# Patient Record
Sex: MALE | Race: BLACK OR AFRICAN AMERICAN | Employment: PART TIME | URBAN - METROPOLITAN AREA
[De-identification: names, ages, dates, MRNs, and addresses within clinical notes are randomized per-mention and may not be internally consistent; named-entity substitution may affect disease eponyms.]

---

## 2017-06-17 ENCOUNTER — HOSPITAL ENCOUNTER (EMERGENCY)
Facility: CLINIC | Age: 27
Discharge: HOME | End: 2017-06-17
Attending: EMERGENCY MEDICINE | Admitting: EMERGENCY MEDICINE

## 2017-06-17 VITALS
OXYGEN SATURATION: 100 % | HEART RATE: 64 BPM | DIASTOLIC BLOOD PRESSURE: 82 MMHG | RESPIRATION RATE: 18 BRPM | SYSTOLIC BLOOD PRESSURE: 119 MMHG | TEMPERATURE: 97.1 F

## 2017-06-17 DIAGNOSIS — K04.7 DENTAL ABSCESS: ICD-10-CM

## 2017-06-17 DIAGNOSIS — R68.84 PAIN IN LOWER JAW: ICD-10-CM

## 2017-06-17 PROCEDURE — 99283 EMERGENCY DEPT VISIT LOW MDM: CPT

## 2017-06-17 PROCEDURE — 41800 DRAINAGE OF GUM LESION: CPT

## 2017-06-17 RX ORDER — CLINDAMYCIN HCL 300 MG
300 CAPSULE ORAL 3 TIMES DAILY
Qty: 30 CAPSULE | Refills: 0 | Status: SHIPPED | OUTPATIENT
Start: 2017-06-17 | End: 2017-06-27

## 2017-06-17 RX ORDER — LIDOCAINE HYDROCHLORIDE AND EPINEPHRINE BITARTRATE 20; .01 MG/ML; MG/ML
INJECTION, SOLUTION SUBCUTANEOUS
Status: DISCONTINUED
Start: 2017-06-17 | End: 2017-06-17 | Stop reason: HOSPADM

## 2017-06-17 RX ORDER — HYDROCODONE BITARTRATE AND ACETAMINOPHEN 5; 325 MG/1; MG/1
1-2 TABLET ORAL EVERY 4 HOURS PRN
Qty: 12 TABLET | Refills: 0 | Status: SHIPPED | OUTPATIENT
Start: 2017-06-17 | End: 2018-07-17

## 2017-06-17 ASSESSMENT — ENCOUNTER SYMPTOMS: FEVER: 0

## 2017-06-17 NOTE — ED PROVIDER NOTES
History     Chief Complaint:  Dental Pain      The history is provided by the patient.      Humberto Jacinto is a 26 year old male who presents with dental pain. Patient states that he has been having dental pain for the last three days. He believes he has a dental abscess which led him to present to the ED. Patient denies any recent antibiotics. Patient denies fevers. Patient denies all other complaints.     Allergies:  No known drug allergies      Medications:    The patient is not currently taking any prescribed medications.     Past Medical History:    The patient does not have any past pertinent medical history.     Past Surgical History:    History reviewed. No pertinent surgical history.     Family History:    History reviewed. No pertinent family history.      Social History:  Presents alone   Tobacco use: Yes  Marital Status:  Single     Review of Systems   Constitutional: Negative for fever.   HENT: Positive for dental problem.    All other systems reviewed and are negative.      Physical Exam     Patient Vitals for the past 24 hrs:   BP Temp Temp src Pulse Resp SpO2   06/17/17 0637 119/82 - - - - 100 %   06/17/17 0545 (!) 119/91 97.1  F (36.2  C) Temporal 64 18 100 %      Physical Exam  Nursing note and vitals reviewed.  Constitutional: Cooperative.   HENT:   Mouth/Throat: Mucous membranes are normal. Poor overall dentition. Multiple dental erosions to the level of the gum. Dental abscess associated with left lower molars. No sublingual swelling.   Cardiovascular: Normal rate, regular rhythm and normal heart sounds.  No murmur.  Pulmonary/Chest: Effort normal and breath sounds normal. No respiratory distress.   Neurological: Alert.   Skin: Skin is warm and dry.  Psychiatric: Normal mood and affect.      Emergency Department Course   Procedures:  PROCEDURE:  Dental Block    LOCATION:   Left inferior alveolar dental block     ANESTHESIA: Regional block using Lido w/ epi, total of 1.8 mLs    PROCEDURE NOTE:  The patient tolerated the procedure well with good relief of dental discomfort and there were no complications.      Procedure: Incision and Drainage   LOCATIONS: left lower jaw      ANESTHESIA:  Local field block using Lidocaine 1% with epinephrine, total of 1.8 mLs     PROCEDURE:  Area was incised with # 11 Blade ([place]) with a Single Straight incision.  Wound treatment included Purulent Drainage.     Patient Status:        Patient tolerated the procedure well. There were no complications.    Emergency Department Course:  Past medical records, nursing notes, and vitals reviewed.  0551: I performed an exam of the patient and obtained history as documented above.    Above workup undertaken.  I personally reviewed the laboratory results with the Patient and answered all related questions prior to discharge.        Impression & Plan    Medical Decision Making:  Humberto Jacinto is a 26 year old male presents left lower jaw pain. He has clinically obvious dental abscess associated with very poor dentition and dental erosions to the level of the gum line. Abscess was incised and drained as in the procedure note. We will start him on antibiotics with a short course of pain medication and have him follow up with dentistry this week.       Diagnosis:    ICD-10-CM   1. Pain in lower jaw R68.84   2. Dental abscess K04.7       Disposition:  Discharged to home with plan as outlined.    Discharge Medications:  Discharge Medication List as of 6/17/2017  6:32 AM      START taking these medications    Details   clindamycin (CLEOCIN) 300 MG capsule Take 1 capsule (300 mg) by mouth 3 times daily for 10 days, Disp-30 capsule, R-0, Local Print      HYDROcodone-acetaminophen (NORCO) 5-325 MG per tablet Take 1-2 tablets by mouth every 4 hours as needed for moderate to severe pain, Disp-12 tablet, R-0, Local Print               [facility] EMERGENCY DEPARTMENT  I, Kimberly Prakash am serving as a scribe at 5:51 AM on  6/17/2017 to document services personally performed by Francisco Pan MD based on my observations and the provider's statements to me.       Francisco Pan MD  06/18/17 0328

## 2017-06-17 NOTE — ED AVS SNAPSHOT
[facility] Emergency Department    [address]    [facility] [address]    Phone:  [phone]    Fax:  [phone]                                       Humberto Jacinto   MRN: [id number]    Department:  [facility] Emergency Department   Date of Visit:  6/17/2017           After Visit Summary Signature Page     I have received my discharge instructions, and my questions have been answered. I have discussed any challenges I see with this plan with the nurse or doctor.    ..........................................................................................................................................  Patient/Patient Representative Signature      ..........................................................................................................................................  Patient Representative Print Name and Relationship to Patient    ..................................................               ................................................  Date                                            Time    ..........................................................................................................................................  Reviewed by Signature/Title    ...................................................              ..............................................  Date                                                            Time

## 2017-06-17 NOTE — ED AVS SNAPSHOT
[facility] Emergency Department    [address]    [facility] [address]    Phone:  [phone]    Fax:  [phone]                                       Humberto Jacinto   MRN: [id number]    Department:  [facility] Emergency Department   Date of Visit:  6/17/2017           Patient Information     Date Of Birth          1990        Your diagnoses for this visit were:     Pain in lower jaw     Dental abscess Left lower jaw       You were seen by Francisco Pan MD.      Follow-up Information     Follow up with Dentistry this week.        Discharge Instructions         Dental Abscess  An abscess is a sac of pus. A dental abscess forms when a tooth or the tissue around it becomes infected with bacteria. The bacteria can enter through a cavity or a crack in a tooth. It can also infect the gum tissue or bone around a tooth. An untreated abscess can cause the loss of the tooth. It can even spread to other parts of the body and become life-threatening.    Symptoms of a dental abscess   Signs of a dental abscess include:    Toothache, often severe    Tooth pain with hot, cold, or pressure    Pain in the gums, cheek, or jaw    Bad breath or bitter taste in the mouth    Trouble swallowing or opening the mouth    Fever    Swollen or enlarged glands in the neck  Diagnosing a dental abscess  An abscess is diagnosed by looking at your teeth and gums. You will be told if any tests, like dental X-rays, are needed.  Treating a dental abscess  Treatments for a dental abscess may include the following:    Antibiotic medicines to treat the underlying infection.    Pain relievers to help you feel more comfortable. Your health care provider may prescribe a medicine for you. Or, use over-the-counter pain relievers, like acetaminophen or ibuprofen.    Warm saltwater rinses to soothe discomfort and help clear away pus.    Root canal surgery if needed to save the tooth. With a root canal, the  infected part of the tooth is removed. A special substance is then used to fill the empty space in the tooth.    Drainage of the abscess if needed. Incisions are made to allow the infected material to drain from the tooth.    Removal of the tooth in cases of severe infection that can t be treated another way.  If the infection is severe, has spread, or doesn t respond to treatment, you may need to be admitted to a hospital.        When to call the dentist  Call your dentist right away if you have any of the following:    Fever of 100.4 F (38 C) or higher    Increased pain, redness, drainage, or swelling in the treated area    Swelling of the face or jawbone    Pain that cannot be controlled with medicines   Preventing dental abscess  To prevent another abscess in the future, keep your teeth clean and healthy. Brush twice a day and floss at least once daily. See your dentist for regular tooth cleanings. And avoid sugary foods and drinks that can lead to tooth decay.  Date Last Reviewed: 7/14/2015 2000-2017 [employer]. [address] [place] [address]. All rights reserved. This information is not intended as a substitute for professional medical care. Always follow your healthcare professional's instructions.          24 Hour Appointment Hotline       To make an appointment at any [facility] clinic, call [phone] ([phone]). If you don't have a family doctor or clinic, we will help you find one. [facility] clinics are conveniently located to serve the needs of you and your family.             Review of your medicines      START taking        Dose / Directions Last dose taken    clindamycin 300 MG capsule   Commonly known as:  CLEOCIN   Dose:  300 mg   Quantity:  30 capsule        Take 1 capsule (300 mg) by mouth 3 times daily for 10 days   Refills:  0        HYDROcodone-acetaminophen 5-325 MG per tablet   Commonly known as:  NORCO   Dose:  1-2 tablet   Quantity:  12 tablet         Take 1-2 tablets by mouth every 4 hours as needed for moderate to severe pain   Refills:  0                Prescriptions were sent or printed at these locations (2 Prescriptions)                   Other Prescriptions                Printed at Department/Unit printer (2 of 2)         clindamycin (CLEOCIN) 300 MG capsule               HYDROcodone-acetaminophen (NORCO) 5-325 MG per tablet                Orders Needing Specimen Collection     None      Pending Results     No orders found from 6/15/2017 to 6/18/2017.            Pending Culture Results     No orders found from 6/15/2017 to 6/18/2017.            Pending Results Instructions     If you had any lab results that were not finalized at the time of your Discharge, you can call the ED Lab Result RN at [phone]. You will be contacted by this team for any positive Lab results or changes in treatment. The nurses are available 7 days a week from 10A to 6:30P.  You can leave a message 24 hours per day and they will return your call.        Test Results From Your Hospital Stay               Clinical Quality Measure: Blood Pressure Screening     Your blood pressure was checked while you were in the emergency department today. The last reading we obtained was  BP: (!) 119/91 . Please read the guidelines below about what these numbers mean and what you should do about them.  If your systolic blood pressure (the top number) is less than 120 and your diastolic blood pressure (the bottom number) is less than 80, then your blood pressure is normal. There is nothing more that you need to do about it.  If your systolic blood pressure (the top number) is 120-139 or your diastolic blood pressure (the bottom number) is 80-89, your blood pressure may be higher than it should be. You should have your blood pressure rechecked within a year by a primary care provider.  If your systolic blood pressure (the top number) is 140 or greater or your diastolic blood pressure (the bottom  "number) is 90 or greater, you may have high blood pressure. High blood pressure is treatable, but if left untreated over time it can put you at risk for heart attack, stroke, or kidney failure. You should have your blood pressure rechecked by a primary care provider within the next 4 weeks.  If your provider in the emergency department today gave you specific instructions to follow-up with your doctor or provider even sooner than that, you should follow that instruction and not wait for up to 4 weeks for your follow-up visit.        Thank you for choosing [facility]       Thank you for choosing [facility] for your care. Our goal is always to provide you with excellent care. Hearing back from our patients is one way we can continue to improve our services. Please take a few minutes to complete the written survey that you may receive in the mail after you visit with us. Thank you!        [facility] Information     [employer] lets you send messages to your doctor, view your test results, renew your prescriptions, schedule appointments and more. To sign up, go to [email or web address] . Click on \"Log in\" on the left side of the screen, which will take you to the Welcome page. Then click on \"Sign up Now\" on the right side of the page.     You will be asked to enter the access code listed below, as well as some personal information. Please follow the directions to create your username and password.     Your access code is: [email or web address]  Expires: 9/15/2017  6:32 AM     Your access code will  in 90 days. If you need help or a new code, please call your [facility] clinic or [phone].        Care EveryWhere ID     This is your Care EveryWhere ID. This could be used by other organizations to access your [facility] medical records  [id number]        After Visit Summary       This is your record. Keep this with you and show to your community pharmacist(s) and doctor(s) at your next visit.                  "

## 2017-06-17 NOTE — DISCHARGE INSTRUCTIONS
Dental Abscess  An abscess is a sac of pus. A dental abscess forms when a tooth or the tissue around it becomes infected with bacteria. The bacteria can enter through a cavity or a crack in a tooth. It can also infect the gum tissue or bone around a tooth. An untreated abscess can cause the loss of the tooth. It can even spread to other parts of the body and become life-threatening.    Symptoms of a dental abscess   Signs of a dental abscess include:    Toothache, often severe    Tooth pain with hot, cold, or pressure    Pain in the gums, cheek, or jaw    Bad breath or bitter taste in the mouth    Trouble swallowing or opening the mouth    Fever    Swollen or enlarged glands in the neck  Diagnosing a dental abscess  An abscess is diagnosed by looking at your teeth and gums. You will be told if any tests, like dental X-rays, are needed.  Treating a dental abscess  Treatments for a dental abscess may include the following:    Antibiotic medicines to treat the underlying infection.    Pain relievers to help you feel more comfortable. Your health care provider may prescribe a medicine for you. Or, use over-the-counter pain relievers, like acetaminophen or ibuprofen.    Warm saltwater rinses to soothe discomfort and help clear away pus.    Root canal surgery if needed to save the tooth. With a root canal, the infected part of the tooth is removed. A special substance is then used to fill the empty space in the tooth.    Drainage of the abscess if needed. Incisions are made to allow the infected material to drain from the tooth.    Removal of the tooth in cases of severe infection that can t be treated another way.  If the infection is severe, has spread, or doesn t respond to treatment, you may need to be admitted to a hospital.        When to call the dentist  Call your dentist right away if you have any of the following:    Fever of 100.4 F (38 C) or higher    Increased pain, redness, drainage, or swelling in the  treated area    Swelling of the face or jawbone    Pain that cannot be controlled with medicines   Preventing dental abscess  To prevent another abscess in the future, keep your teeth clean and healthy. Brush twice a day and floss at least once daily. See your dentist for regular tooth cleanings. And avoid sugary foods and drinks that can lead to tooth decay.  Date Last Reviewed: 7/14/2015 2000-2017 [employer]. [address] [place] [address], [place] [address]. All rights reserved. This information is not intended as a substitute for professional medical care. Always follow your healthcare professional's instructions.

## 2017-06-17 NOTE — ED NOTES
Pt to ER with c/o left lower dental infection, pt noted pain 3 days ago now increased pain answelling

## 2018-07-12 ENCOUNTER — HOSPITAL ENCOUNTER (OUTPATIENT)
Dept: BEHAVIORAL HEALTH | Facility: CLINIC | Age: 28
Discharge: HOME | End: 2018-07-12
Attending: SOCIAL WORKER | Admitting: SOCIAL WORKER
Payer: MEDICAID

## 2018-07-12 VITALS — BODY MASS INDEX: 20.7 KG/M2 | HEIGHT: 76 IN | WEIGHT: 170 LBS

## 2018-07-12 PROCEDURE — H0001 ALCOHOL AND/OR DRUG ASSESS: HCPCS

## 2018-07-12 ASSESSMENT — ANXIETY QUESTIONNAIRES
3. WORRYING TOO MUCH ABOUT DIFFERENT THINGS: NEARLY EVERY DAY
4. TROUBLE RELAXING: NEARLY EVERY DAY
7. FEELING AFRAID AS IF SOMETHING AWFUL MIGHT HAPPEN: NEARLY EVERY DAY
GAD7 TOTAL SCORE: 21
2. NOT BEING ABLE TO STOP OR CONTROL WORRYING: NEARLY EVERY DAY
5. BEING SO RESTLESS THAT IT IS HARD TO SIT STILL: NEARLY EVERY DAY
6. BECOMING EASILY ANNOYED OR IRRITABLE: NEARLY EVERY DAY
1. FEELING NERVOUS, ANXIOUS, OR ON EDGE: NEARLY EVERY DAY

## 2018-07-12 ASSESSMENT — PAIN SCALES - GENERAL: PAINLEVEL: NO PAIN (0)
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
## 2018-07-13 ASSESSMENT — PATIENT HEALTH QUESTIONNAIRE - PHQ9: SUM OF ALL RESPONSES TO PHQ QUESTIONS 1-9: 18

## 2018-07-13 ASSESSMENT — ANXIETY QUESTIONNAIRES: GAD7 TOTAL SCORE: 21

## 2018-07-16 NOTE — PROGRESS NOTES
Visit Date:   07/12/2018      CHEMICAL DEPENDENCY ASSESSMENT      EVALUATION COUNSELOR:  AZEEM Paulino.   CLIENT'S ADDRESS:  [address] [place] [address], [place] [address].   TELEPHONE NUMBER:  [phone].   STATISTICS:  YOB: 1990.  Age:  27.  Sex:  Male.  Marital Status:  Single.   INSURANCE:  Medicaid.   REFERRAL SOURCE:  Self.      REASON FOR EVALUATION:  Humberto Jacinto reports he smokes weed and he has been having a hard time stopping on his own.  He has had unsuccessful attempts at quitting and at this time is wanting to seek treatment.  Specifically, residential as he feels his home environment is not supportive to him being able to establish sobriety.      HEALTH HISTORY AND MEDICATIONS:  Client denies any health conditions or concerns. In 2011, he did have a Bell's palsy episode.  He does not have a current primary care provider.  No current medications.      HISTORY OF PREVIOUS TREATMENT AND COUNSELING:  Client denies any history of detox admissions.  He denies any history of hospitalizations related to drug or alcohol use.  He does report he has been seen in the ED, related to anxiety symptoms in the past.  He reports no current mental health services and no history of chemical dependency treatment or support groups.      HISTORY OF ALCOHOL AND DRUG USE:  Client reports marijuana use, age of first use 12.  Reports he smokes daily 5-7 grams of marijuana or about an ounce and a half a week.  Last use 7/12/2018.  He also reports alcohol use, age of first use 16.  He reports once a month he may have a couple drinks.  Last use 2 weeks ago.  Client reports he is a tobacco user, age of first use 16.  Reports he smokes 15 cigarettes a day, last use 7/12/2018.  Client denies any other substance use.      SUMMARY OF CHEMICAL DEPENDENCY SYMPTOMS ACKNOWLEDGED BY CLIENT:  Client identifies with 6 out of the 11 DSM-V criteria for impression of a substance use disorder.      SUMMARY OF  COLLATERAL DATA:  I spoke with the client's friend.  He reports no concerns with use and he has never seen the client consume alcohol or drugs.  Also reviewed [facility] electronic health record including previous ED admissions.      MENTAL STATUS ASSESSMENT:  Physical appearance and attire:  Appears stated age, in attire appropriate to age and situation.  Hygiene well groomed.  Eye contact at examiner.  Speech regular, volume regular, quality fluid.  Cognitive perceptual reality based.  Cognition:  Memory intact, judgment intact, insight intact.  Orientation to time, place, person and situation.  Thought logical.  Hallucinations:  None.  General behavioral tone:  Cooperative.  Psychomotor activity:  No problem noted.  Gait:  No problem.  Mood appropriate.  Affect congruent and appropriate.      VULNERABLE ADULT ASSESSMENT:  This person is not a functional vulnerable adult according to [place] statute 626.5572, subdivision 21.      DIAGNOSTIC IMPRESSION:     1.  F12.20, cannabis use disorder, severe.   2.  F17.200, tobacco use disorder, severe.      [facility] PLACEMENT CRITERIA:   DIMENSION 1:  Intoxication withdrawal:  Risk level 1.  Client reports use the morning of evaluation.  No immediate safety concerns were observed and client was alert and oriented.      DIMENSION 2:  Biomedical conditions:  Risk level 0.  No health concerns reported.      DIMENSION 3:  Emotional/Behavioral:  Risk level 2.  Client denies any formal mental health diagnosis, but does report struggling with depression and anxiety.  He reports his current relationship is stressful for him and he feels isolated.  He reports passive suicidal ideation, but no intent or plan and this day he denies any suicidal ideation.      DIMENSION 4:  Readiness to Change:  Risk level 0.  Client is voluntarily seeking treatment and wants to stop smoking marijuana.      DIMENSION 5:  Relapse and Continued Use Potential:  Risk level 4.  The patient has been unsuccessful  in trying to quit smoking marijuana on his own.  He struggles with cravings and lacks education on addiction. He also reports mental health symptoms that he uses to cope with and usable daily sober living skills and stress management.      DIMENSION 6:  Recovery Environment:  Risk level 4.  Client reports he lives with his girlfriend and their daughter as well as her 2 other children and another roommate.  He reports girlfriend is not supportive and also smokes marijuana.  He denies any legal issues.  He reports he is currently in school, but no formal employment.      INITIAL PROBLEM LIST:  Client currently lives in an unsupportive environment and lacks a sober peer support network.      RECOMMENDATIONS:  Client is recommended to attend a residential treatment program.      RATIONALE:  At this time, client's current home environment is unsupportive to him maintaining abstinence as there is also use in the home and he is in a strained relationship.  He has attempted to quit on his own and has been unsuccessful, so would benefit from residential treatment to establish about 30 days sobriety and possibly find alternative living plan to help maintain that.         This information has been disclosed to you from records protected by Federal confidentiality rules (42 CFR part 2). The Federal rules prohibit you from making any further disclosure of this information unless further disclosure is expressly permitted by the written consent of the person to whom it pertains or as otherwise permitted by 42 CFR part 2. A general authorization for the release of medical or other information is NOT sufficient for this purpose. The Federal rules restrict any use of the information to criminally investigate or prosecute any alcohol or drug abuse patient.      AZEEM HURTADO             D: 2018   T: 2018   MT: SHANNON      Name:     RETA CARBAJAL   MRN:      [id number]        Account:      [id number]   :       1990           Visit Date:   07/12/2018      Document: [id number]

## 2018-07-17 ENCOUNTER — HOSPITAL ENCOUNTER (EMERGENCY)
Facility: CLINIC | Age: 28
Discharge: HOME | End: 2018-07-17
Attending: EMERGENCY MEDICINE | Admitting: EMERGENCY MEDICINE

## 2018-07-17 VITALS
HEART RATE: 68 BPM | DIASTOLIC BLOOD PRESSURE: 78 MMHG | WEIGHT: 165 LBS | RESPIRATION RATE: 16 BRPM | BODY MASS INDEX: 20.08 KG/M2 | TEMPERATURE: 97.4 F | SYSTOLIC BLOOD PRESSURE: 117 MMHG | OXYGEN SATURATION: 97 %

## 2018-07-17 DIAGNOSIS — K04.7 ACUTE PERIAPICAL ABSCESS: ICD-10-CM

## 2018-07-17 DIAGNOSIS — Z72.0 TOBACCO USE: ICD-10-CM

## 2018-07-17 PROCEDURE — 25000132 ZZH RX MED GY IP 250 OP 250 PS 637: Performed by: EMERGENCY MEDICINE

## 2018-07-17 PROCEDURE — 99284 EMERGENCY DEPT VISIT MOD MDM: CPT | Mod: 25

## 2018-07-17 PROCEDURE — 41800 DRAINAGE OF GUM LESION: CPT

## 2018-07-17 RX ORDER — IBUPROFEN 600 MG/1
600 TABLET, FILM COATED ORAL EVERY 6 HOURS PRN
Qty: 40 TABLET | Refills: 0 | Status: SHIPPED | OUTPATIENT
Start: 2018-07-17 | End: 2018-07-27

## 2018-07-17 RX ORDER — LIDOCAINE HYDROCHLORIDE AND EPINEPHRINE 10; 10 MG/ML; UG/ML
1 INJECTION, SOLUTION INFILTRATION; PERINEURAL ONCE
Status: DISCONTINUED | OUTPATIENT
Start: 2018-07-17 | End: 2018-07-17 | Stop reason: HOSPADM

## 2018-07-17 RX ORDER — IBUPROFEN 800 MG/1
800 TABLET, FILM COATED ORAL ONCE
Status: COMPLETED | OUTPATIENT
Start: 2018-07-17 | End: 2018-07-17

## 2018-07-17 RX ADMIN — IBUPROFEN 800 MG: 800 TABLET, FILM COATED ORAL at 08:21

## 2018-07-17 ASSESSMENT — ENCOUNTER SYMPTOMS
FEVER: 0
VOMITING: 0

## 2018-07-17 NOTE — ED TRIAGE NOTES
Arrives with pain and swelling in left lower started last night, pt has had broken tooth for some time. No dental visit. A/ox 3, ABC's intact.

## 2018-07-17 NOTE — DISCHARGE INSTRUCTIONS
Stop smoking!  Take antibiotics as instructed even if you are feeling better and follow-up as soon as possible with dentistry. List provided.  You can ice the area and you should also continue ibuprofen every 6 hours as needed for pain.  Return with uncontrolled pain, fever greater than 101 F, vomiting, or any other concerns.

## 2018-07-17 NOTE — ED AVS SNAPSHOT
[facility] Emergency Department    [address]    [facility] [address]    Phone:  [phone]    Fax:  [phone]                                       Humberto Jacinto   MRN: [id number]    Department:  [facility] Emergency Department   Date of Visit:  7/17/2018           After Visit Summary Signature Page     I have received my discharge instructions, and my questions have been answered. I have discussed any challenges I see with this plan with the nurse or doctor.    ..........................................................................................................................................  Patient/Patient Representative Signature      ..........................................................................................................................................  Patient Representative Print Name and Relationship to Patient    ..................................................               ................................................  Date                                            Time    ..........................................................................................................................................  Reviewed by Signature/Title    ...................................................              ..............................................  Date                                                            Time

## 2018-07-17 NOTE — ED AVS SNAPSHOT
[facility] Emergency Department    [address]    [facility] [address]    Phone:  [phone]    Fax:  [phone]                                       Humberto Jacinto   MRN: [id number]    Department:  [facility] Emergency Department   Date of Visit:  7/17/2018           Patient Information     Date Of Birth          1990        Your diagnoses for this visit were:     Acute periapical abscess     Tobacco use        You were seen by Eryn Ramos MD.      Follow-up Information     Follow up with Dentistry In 2 days.        [facility]    Why:  As needed, primary care provider    Contact information:    [address] [facility]  [place] [address] [phone]          Follow up with [facility] Emergency Department.    Specialty:  EMERGENCY MEDICINE    Why:  If symptoms worsen    Contact information:    [address]  [facility] [id number] [phone]        Discharge Instructions       Stop smoking!  Take antibiotics as instructed even if you are feeling better and follow-up as soon as possible with dentistry. List provided.  You can ice the area and you should also continue ibuprofen every 6 hours as needed for pain.  Return with uncontrolled pain, fever greater than 101 F, vomiting, or any other concerns.    Discharge References/Attachments     ABSCESS, DENTAL (ENGLISH)    KICKING THE SMOKING HABIT (ENGLISH)      24 Hour Appointment Hotline       To make an appointment at any [facility] clinic, call [phone] ([phone]). If you don't have a family doctor or clinic, we will help you find one. [facility] clinics are conveniently located to serve the needs of you and your family.             Review of your medicines      START taking        Dose / Directions Last dose taken    amoxicillin-clavulanate 875-125 MG per tablet   Commonly known as:  AUGMENTIN   Dose:  1 tablet   Quantity:  20 tablet         Take 1 tablet by mouth 2 times daily for 10 days   Refills:  0        ibuprofen 600 MG tablet   Commonly known as:  ADVIL/MOTRIN   Dose:  600 mg   Quantity:  40 tablet        Take 1 tablet (600 mg) by mouth every 6 hours as needed for moderate pain   Refills:  0                Prescriptions were sent or printed at these locations (2 Prescriptions)                   Other Prescriptions                Printed at Department/Unit printer (2 of 2)         amoxicillin-clavulanate (AUGMENTIN) 875-125 MG per tablet               ibuprofen (ADVIL/MOTRIN) 600 MG tablet                Orders Needing Specimen Collection     None      Pending Results     No orders found from 7/15/2018 to 7/18/2018.            Pending Culture Results     No orders found from 7/15/2018 to 7/18/2018.            Pending Results Instructions     If you had any lab results that were not finalized at the time of your Discharge, you can call the ED Lab Result RN at [phone]. You will be contacted by this team for any positive Lab results or changes in treatment. The nurses are available 7 days a week from 10A to 6:30P.  You can leave a message 24 hours per day and they will return your call.        Test Results From Your Hospital Stay               Clinical Quality Measure: Blood Pressure Screening     Your blood pressure was checked while you were in the emergency department today. The last reading we obtained was  BP: 117/78 . Please read the guidelines below about what these numbers mean and what you should do about them.  If your systolic blood pressure (the top number) is less than 120 and your diastolic blood pressure (the bottom number) is less than 80, then your blood pressure is normal. There is nothing more that you need to do about it.  If your systolic blood pressure (the top number) is 120-139 or your diastolic blood pressure (the bottom number) is 80-89, your blood pressure may be higher than it should be. You should have your blood  "pressure rechecked within a year by a primary care provider.  If your systolic blood pressure (the top number) is 140 or greater or your diastolic blood pressure (the bottom number) is 90 or greater, you may have high blood pressure. High blood pressure is treatable, but if left untreated over time it can put you at risk for heart attack, stroke, or kidney failure. You should have your blood pressure rechecked by a primary care provider within the next 4 weeks.  If your provider in the emergency department today gave you specific instructions to follow-up with your doctor or provider even sooner than that, you should follow that instruction and not wait for up to 4 weeks for your follow-up visit.        Thank you for choosing [facility]       Thank you for choosing [facility] for your care. Our goal is always to provide you with excellent care. Hearing back from our patients is one way we can continue to improve our services. Please take a few minutes to complete the written survey that you may receive in the mail after you visit with us. Thank you!        [employer] Information     [employer] lets you send messages to your doctor, view your test results, renew your prescriptions, schedule appointments and more. To sign up, go to [email or web address] . Click on \"Log in\" on the left side of the screen, which will take you to the Welcome page. Then click on \"Sign up Now\" on the right side of the page.     You will be asked to enter the access code listed below, as well as some personal information. Please follow the directions to create your username and password.     Your access code is: [email or web address]  Expires: 10/15/2018 10:09 AM     Your access code will  in 90 days. If you need help or a new code, please call your [facility] clinic or [phone].        Care EveryWhere ID     This is your Care EveryWhere ID. This could be used by other organizations to access your [facility] medical records  [id number]        Equal " Access to Services     [facility] : Nuzhat Wiseman, vicenta wong, qaashanti oconnor. So [facility] [phone].    ATENCIÓN: Si habla español, tiene a hart disposición servicios gratuitos de asistencia lingüística. Llame al [phone].    We comply with applicable federal civil rights laws and [place] laws. We do not discriminate on the basis of race, color, national origin, age, disability, sex, sexual orientation, or gender identity.            After Visit Summary       This is your record. Keep this with you and show to your community pharmacist(s) and doctor(s) at your next visit.

## 2018-07-17 NOTE — LETTER
July 17, 2018      To Whom It May Concern:      Humberto Jacinto was seen in our Emergency Department today, 07/17/18.  I expect his condition to improve over the next 1 days.  He may return to work when improved.    Sincerely,        Isabela Ellis RN

## 2018-07-17 NOTE — ED PROVIDER NOTES
"  History     Chief Complaint:  Dental Pain    The history is provided by the patient.      Humberto Jacinto is an otherwise healthy 27 year old male smoker who presents for evaluation of dental pain. The patient reports that he broke a tooth on his lower left side over a year ago. He states he had some mild pain yesterday and developed swelling on the left side of his face and gums overnight last night. His dental pain this morning is \"tremendous,\" but non-radiating. He denies fever or vomiting. No alleviating factors though pain exacerbated by palpation. Of note, the patient states that he has not seen a dentist for quite some time now. He has a history of dental infection within the last year.    Allergies:  The patient has no known drug allergies.    Medications:    The patient is currently on no regular medications.    Past Medical History:    The patient denies any significant past medical history.    Past Surgical History:    The patient does not have any pertinent past surgical history.    Family History:    Father: substance abuse    Social History:  Positive for tobacco use, about a pack a day. Denies smokeless tobacco use.   Negative for alcohol use.  Marital Status:  Single     Review of Systems   Constitutional: Negative for fever.   HENT: Positive for dental problem.    Gastrointestinal: Negative for vomiting.   All other systems reviewed and are negative.      Physical Exam     Patient Vitals for the past 24 hrs:   BP Temp Temp src Pulse Resp SpO2 Weight   07/17/18 0815 117/78 97.4  F (36.3  C) Temporal 68 16 97 % 74.8 kg (165 lb)       Physical Exam  General: WD/WN; well appearing young  man; cooperative  Head:  Atraumatic  Eyes:  Conjunctivae, lids, and sclerae are normal  ENT:    Normal nose; MMM; tenderness, erythema, and focal fluctuance of the gingiva lateral to the left mandibular premolars with mild associated facial swelling externally in correlation with this fluctuance; " generally poor dentition with multiple caries and old appearing tooth fractures; no submandibular swelling or induration, no sublingual edema or induration  Neck:  Supple; normal ROM  Resp:  No respiratory distress  GI:  Nondistended    MS:  Normal ROM  Skin:  Warm; non-diaphoretic; no pallor  Neuro:  Awake; A&Ox3  Psych: Normal mood and affect; normal speech  Vitals reviewed.      Emergency Department Course   Procedures:  PROCEDURE:  Dental Block  LOCATION:  Left inferior alveolar and left mental  ANESTHESIA: Regional block using Lidocaine 1% with epinephrine, total of 5 ccs   PROCEDURE NOTE: The patient tolerated the procedure well with good relief of discomfort and there were no complications.    Procedure: Incision and Drainage   LOCATIONS:  Left mandibular periapical      ANESTHESIA:  Dental block. See procedure note      PROCEDURE:  Area was incised with # 11 Blade ([place]) with a Single Straight incision.  Wound treatment included Purulent Drainage and Expression of Material. No Packing.      Patient Status:        Patient tolerated the procedure well. There were no complications.      Interventions:  0821 Advil 800 mg PO      Emergency Department Course:  Nursing notes and vitals reviewed. (0923) I performed an exam of the patient as documented above.     Medicine administered as documented above.     0950 I rechecked the patient and discussed the results of his workup thus far. I performed a dental block as well as an incision and drainage of the patient periapical abscess, see procedure notes above for details.     Findings and plan explained to the patient. Patient discharged home with instructions regarding supportive care, medications, and reasons to return. The importance of close follow-up was reviewed. The patient was prescribed Augmentin and Advil.       Impression & Plan    Medical Decision Making:  Humberto is a 27-year-old man who presents with 1 day of left-sided facial swelling associated  "with tooth pain.  Patient states his tooth has been \"broken off\" for quite some time though this pain and swelling has only recently developed within last day.  He has not had any fever or vomiting.  He has had prior dental infection in the past year.  He does not regularly see a dentist and he uses tobacco.  Exam reveals a periapical abscess in the left mandibular premolars.  Patient had adequate pain control with Motrin and an inferior alveolar and mental nerve block was utilized in order to incise and drain his apical abscess with excellent return of brant purulent discharge.  Patient tolerated the procedure well and had overall improvement in his facial swelling. Anesthesia provided excellent pain control.  I counseled the patient to stop smoking as this is a worsening his already poor dentition and contributing to his infection.  I placed the patient on Augmentin and instructed him to follow-up as soon as possible with dentistry.  Dental referral list was provided.  I recommended he ice as needed and use ibuprofen every 6 hours as needed for pain.  I discussed strict return precautions including fever and answered all the patient's questions.  He verbalized understanding and is amenable to discharge.    Diagnosis:    ICD-10-CM    1. Acute periapical abscess K04.7    2. Tobacco use Z72.0        Disposition:  discharged home    Discharge Medications:  New Prescriptions    AMOXICILLIN-CLAVULANATE (AUGMENTIN) 875-125 MG PER TABLET    Take 1 tablet by mouth 2 times daily for 10 days    IBUPROFEN (ADVIL/MOTRIN) 600 MG TABLET    Take 1 tablet (600 mg) by mouth every 6 hours as needed for moderate pain     Scribe Disclosure:  I,  Abhijit Brown, am serving as a scribe on 7/17/2018 at 9:23 AM to personally document services performed by Eryn Ramos MD based on my observations and the provider's statements to me.          Abhijit Brown  7/17/2018   [facility] EMERGENCY DEPARTMENT       Eryn Ramos " MD [place]  07/18/18 0949

## 2018-08-21 ENCOUNTER — HOSPITAL ENCOUNTER (EMERGENCY)
Facility: CLINIC | Age: 28
Discharge: HOME | End: 2018-08-21
Attending: NURSE PRACTITIONER | Admitting: NURSE PRACTITIONER
Payer: MEDICAID

## 2018-08-21 ENCOUNTER — APPOINTMENT (OUTPATIENT)
Dept: GENERAL RADIOLOGY | Facility: CLINIC | Age: 28
End: 2018-08-21
Attending: NURSE PRACTITIONER
Payer: MEDICAID

## 2018-08-21 VITALS
BODY MASS INDEX: 20.7 KG/M2 | OXYGEN SATURATION: 100 % | HEIGHT: 76 IN | WEIGHT: 170 LBS | TEMPERATURE: 98.9 F | RESPIRATION RATE: 14 BRPM | DIASTOLIC BLOOD PRESSURE: 70 MMHG | SYSTOLIC BLOOD PRESSURE: 117 MMHG

## 2018-08-21 DIAGNOSIS — J18.9 PNEUMONIA OF LEFT LOWER LOBE DUE TO INFECTIOUS ORGANISM: ICD-10-CM

## 2018-08-21 LAB
ANION GAP SERPL CALCULATED.3IONS-SCNC: 6 MMOL/L (ref 3–14)
BASOPHILS # BLD AUTO: 0 10E9/L (ref 0–0.2)
BASOPHILS NFR BLD AUTO: 0.1 %
BUN SERPL-MCNC: 13 MG/DL (ref 7–30)
CALCIUM SERPL-MCNC: 8.5 MG/DL (ref 8.5–10.1)
CHLORIDE SERPL-SCNC: 108 MMOL/L (ref 94–109)
CO2 SERPL-SCNC: 26 MMOL/L (ref 20–32)
CREAT SERPL-MCNC: 0.92 MG/DL (ref 0.66–1.25)
DIFFERENTIAL METHOD BLD: ABNORMAL
EOSINOPHIL # BLD AUTO: 0.1 10E9/L (ref 0–0.7)
EOSINOPHIL NFR BLD AUTO: 0.7 %
ERYTHROCYTE [DISTWIDTH] IN BLOOD BY AUTOMATED COUNT: 12.5 % (ref 10–15)
GFR SERPL CREATININE-BSD FRML MDRD: >90 ML/MIN/1.7M2
GLUCOSE SERPL-MCNC: 117 MG/DL (ref 70–99)
HCT VFR BLD AUTO: 36.8 % (ref 40–53)
HGB BLD-MCNC: 12.6 G/DL (ref 13.3–17.7)
IMM GRANULOCYTES # BLD: 0.1 10E9/L (ref 0–0.4)
IMM GRANULOCYTES NFR BLD: 0.4 %
INTERPRETATION ECG - MUSE: NORMAL
LYMPHOCYTES # BLD AUTO: 1.7 10E9/L (ref 0.8–5.3)
LYMPHOCYTES NFR BLD AUTO: 10.1 %
MCH RBC QN AUTO: 30.9 PG (ref 26.5–33)
MCHC RBC AUTO-ENTMCNC: 34.2 G/DL (ref 31.5–36.5)
MCV RBC AUTO: 90 FL (ref 78–100)
MONOCYTES # BLD AUTO: 1.6 10E9/L (ref 0–1.3)
MONOCYTES NFR BLD AUTO: 9.9 %
NEUTROPHILS # BLD AUTO: 12.9 10E9/L (ref 1.6–8.3)
NEUTROPHILS NFR BLD AUTO: 78.8 %
NRBC # BLD AUTO: 0 10*3/UL
NRBC BLD AUTO-RTO: 0 /100
PLATELET # BLD AUTO: 263 10E9/L (ref 150–450)
POTASSIUM SERPL-SCNC: 3.9 MMOL/L (ref 3.4–5.3)
RBC # BLD AUTO: 4.08 10E12/L (ref 4.4–5.9)
SODIUM SERPL-SCNC: 140 MMOL/L (ref 133–144)
TROPONIN I SERPL-MCNC: <0.015 UG/L (ref 0–0.04)
WBC # BLD AUTO: 16.4 10E9/L (ref 4–11)

## 2018-08-21 PROCEDURE — 96360 HYDRATION IV INFUSION INIT: CPT

## 2018-08-21 PROCEDURE — 25000132 ZZH RX MED GY IP 250 OP 250 PS 637: Performed by: NURSE PRACTITIONER

## 2018-08-21 PROCEDURE — 25000125 ZZHC RX 250: Performed by: NURSE PRACTITIONER

## 2018-08-21 PROCEDURE — 93005 ELECTROCARDIOGRAM TRACING: CPT

## 2018-08-21 PROCEDURE — 71046 X-RAY EXAM CHEST 2 VIEWS: CPT

## 2018-08-21 PROCEDURE — 84484 ASSAY OF TROPONIN QUANT: CPT | Performed by: NURSE PRACTITIONER

## 2018-08-21 PROCEDURE — 25000128 H RX IP 250 OP 636: Performed by: NURSE PRACTITIONER

## 2018-08-21 PROCEDURE — 85025 COMPLETE CBC W/AUTO DIFF WBC: CPT | Performed by: NURSE PRACTITIONER

## 2018-08-21 PROCEDURE — 99285 EMERGENCY DEPT VISIT HI MDM: CPT | Mod: 25

## 2018-08-21 PROCEDURE — 94640 AIRWAY INHALATION TREATMENT: CPT

## 2018-08-21 PROCEDURE — 80048 BASIC METABOLIC PNL TOTAL CA: CPT | Performed by: NURSE PRACTITIONER

## 2018-08-21 RX ORDER — ALBUTEROL SULFATE 0.83 MG/ML
SOLUTION RESPIRATORY (INHALATION)
Status: DISCONTINUED
Start: 2018-08-21 | End: 2018-08-21 | Stop reason: HOSPADM

## 2018-08-21 RX ORDER — ALBUTEROL SULFATE 5 MG/ML
2.5 SOLUTION RESPIRATORY (INHALATION) EVERY 6 HOURS PRN
Status: DISCONTINUED | OUTPATIENT
Start: 2018-08-21 | End: 2018-08-21

## 2018-08-21 RX ORDER — ALBUTEROL SULFATE 90 UG/1
2 AEROSOL, METERED RESPIRATORY (INHALATION) EVERY 4 HOURS PRN
Qty: 1 INHALER | Refills: 0 | Status: SHIPPED | OUTPATIENT
Start: 2018-08-21

## 2018-08-21 RX ORDER — ALBUTEROL SULFATE 0.83 MG/ML
5 SOLUTION RESPIRATORY (INHALATION) ONCE
Status: COMPLETED | OUTPATIENT
Start: 2018-08-21 | End: 2018-08-21

## 2018-08-21 RX ORDER — ACETAMINOPHEN 500 MG
1000 TABLET ORAL ONCE
Status: COMPLETED | OUTPATIENT
Start: 2018-08-21 | End: 2018-08-21

## 2018-08-21 RX ORDER — ALBUTEROL SULFATE 0.83 MG/ML
2.5 SOLUTION RESPIRATORY (INHALATION) ONCE
Status: COMPLETED | OUTPATIENT
Start: 2018-08-21 | End: 2018-08-21

## 2018-08-21 RX ORDER — SODIUM CHLORIDE 9 MG/ML
1000 INJECTION, SOLUTION INTRAVENOUS CONTINUOUS
Status: DISCONTINUED | OUTPATIENT
Start: 2018-08-21 | End: 2018-08-21 | Stop reason: HOSPADM

## 2018-08-21 RX ORDER — ASPIRIN 81 MG/1
324 TABLET, CHEWABLE ORAL ONCE
Status: COMPLETED | OUTPATIENT
Start: 2018-08-21 | End: 2018-08-21

## 2018-08-21 RX ORDER — AZITHROMYCIN 250 MG/1
TABLET, FILM COATED ORAL
Qty: 6 TABLET | Refills: 0 | Status: SHIPPED | OUTPATIENT
Start: 2018-08-21 | End: 2018-08-26

## 2018-08-21 RX ADMIN — ALBUTEROL SULFATE 5 MG: 2.5 SOLUTION RESPIRATORY (INHALATION) at 16:47

## 2018-08-21 RX ADMIN — ACETAMINOPHEN 1000 MG: 500 TABLET, FILM COATED ORAL at 16:46

## 2018-08-21 RX ADMIN — SODIUM CHLORIDE 1000 ML: 9 INJECTION, SOLUTION INTRAVENOUS at 16:31

## 2018-08-21 RX ADMIN — ASPIRIN 81 MG 324 MG: 81 TABLET ORAL at 16:46

## 2018-08-21 RX ADMIN — ALBUTEROL SULFATE 2.5 MG: 2.5 SOLUTION RESPIRATORY (INHALATION) at 17:31

## 2018-08-21 ASSESSMENT — ENCOUNTER SYMPTOMS
HEADACHES: 1
CHILLS: 1
SORE THROAT: 1
SINUS PAIN: 1
DIAPHORESIS: 1
DIARRHEA: 0
APPETITE CHANGE: 1
CONSTIPATION: 0
COUGH: 1
FEVER: 1
ABDOMINAL PAIN: 0
VOMITING: 1
FATIGUE: 1
WEAKNESS: 1

## 2018-08-21 NOTE — ED PROVIDER NOTES
History     Chief Complaint:  Flu Symptoms    HPI   Humberto Jacinto is a 27 year old male, smoker, who presents to the emergency department today for evaluation of flu symptoms. Patient asserts he has been experiencing flu symptoms since 8/8 and states it has been progressively worse ever since and the patient's laboratory and imaging results [place] [facility] [place] are noted below. [place] [facility] [place] with [place] illness. Since [place] [facility] [place], the patient endorses further gradually worsening productive cough, sore throat, subjective fever, headache, weakness, chills, sinus pain, few episodes of emesis due to cough, and intermittent episodes of diaphoresis throughout the day. The patient states he presents to the ED as he has been experiencing chest pain exacerbated by cough and breathing and fatigue so he is here for evaluation. The patient further states his appetite has diminished and despite a lot of fluids intake, he produces darker yellow urine. The patient denies ear and abdominal pain, diarrhea, constipation, recent travel, utilization of a nebulizer, or a history of pneumonia. He does note several acquaintances with similar symptoms. Of note, the patient has not taken Tylenol or Advil but has finished his robitussin prescription.     [place] [facility] [place]  ED CHEMISTRY LABS(NA,K,CL,CO2,GLU,CREAT,CA-IONIZED,ANION GAP)   Result Value Ref Range   Chloride 106 92 - 108 mEq/L   Glucose 105 (H) 70 - 100 mg/dL   ICA, Actual 4.33 (L) 4.40 - 5.20 mg/dL   Sodium 134 (L) 135 - 148 mEq/L   Creatinine 1.11 0.70 - 1.25 mg/dL   GFR, African American 96 >=60 ml/min/1.73m2   GFR, Non- 79 >=60 ml/min/1.73m2   BICARB 23 22 - 26 mEq/L   ICA, pH Corrected 4.49 4.40 - 5.20 mg/dL   AnGap 5 (L) 8 - 16 mEq/L   Potassium 3.6 3.5 - 5.3 mEq/L    XR chest 2 views PA[place]LAT [place] [facility] [place]  Heart size and pulmonary vasculature within normal limits. Lungs are clear. No effusion or pneumothorax.  Clear  "lungs    Allergies:  No Known Drug Allergies    Medications:    Chlorpheniramine    Past Medical History:    Bell's palsy     Past Surgical History:    History reviewed. No pertinent past surgical history.     Family History:    Mother: Substance Abuse     Social History:  Smoking Status: Current Every Day Smoker   Packs/day: 0.50  Smokeless Tobacco: Never Used  Alcohol Use: Negative   Marital Status: Single     Review of Systems   Constitutional: Positive for appetite change, chills, diaphoresis, fatigue and fever.   HENT: Positive for sinus pain and sore throat. Negative for ear pain.    Respiratory: Positive for cough (productive ).    Cardiovascular: Positive for chest pain.   Gastrointestinal: Positive for vomiting. Negative for abdominal pain, constipation and diarrhea.   Neurological: Positive for weakness and headaches.   All other systems reviewed and are negative.    Physical Exam     Patient Vitals for the past 24 hrs:   BP Temp Temp src Heart Rate Resp SpO2 Height Weight   08/21/18 1730 117/70 - - 81 13 98 % - -   08/21/18 1700 116/65 - - 83 12 100 % - -   08/21/18 1609 117/61 98.9  F (37.2  C) Oral 93 - 94 % 1.93 m (6' 4\") 77.1 kg (170 lb)     Physical Exam  Nursing notes reviewed. Vitals reviewed.  General: Alert. Well kept.  Eyes:  Conjunctiva non-injected, non-icteric.  Neck/Throat: Moist mucous membranes, oropharynx clear without erythema or exudate. No cervical lymphadenopathy.  Normal voice.  Cardiac: Regular rhythm. Normal heart sounds with no murmur/rubs/click.   Pulmonary: Clear and equal breath sounds bilaterally. Right lower lobe crackles. Speaking in full sentences.   Abdomen: Soft. Non-distended. Non-tender to palpation. No masses. No guarding or rebound.  Musculoskeletal: Normal gross range of motion of all 4 extremities.    Neurological: Alert and oriented x4.   Skin: Warm and dry without rashes or petechiae. Normal appearance of visualized exposed skin.  Psych: Affect normal. Good eye " contact.    Emergency Department Course     ECG:  ECG taken at 1639  ECG read at 1652 by RODRI Esquivel MD  ECG read at 1655 by Iqra Gupta CNP  Normal sinus rhythm  Normal ECG  Rate 83 bpm. MI interval 132 ms. QRS duration 98 ms. QT/QTc 348/408 ms. P-R-T axes 56 75 60.    Imaging:  Radiology findings were communicated with the patient who voiced understanding of the findings.    XR Chest 2 Views  Questionable small left basilar infiltrate. This could  potentially be due to a small area of pneumonia. Clinical correlation  suggested.      Reading per radiology    Laboratory:  Laboratory findings were communicated with the patient who voiced understanding of the findings.    CBC: WBC 16.4 (H), HGB 12.6 (L),   BMP: Glucose 117 (H) o/w WNL (Creatinine 0.92)  Troponin (Collected 1630): <0.015    Interventions:  1631 NS 1000 mL IV  1646 Tylenol 1000 mg PO  1646 Aspirin 324 mg PO  1647 Albuterol 5 mg Neb   1731 Albuterol 2.5 mg Neb     Emergency Department Course:    1614 Nursing notes and vitals reviewed.    1619 I performed an exam of the patient as documented above.     1630 IV was inserted and blood was drawn for laboratory testing, results above.    1641 The patient was sent for a x-ray while in the emergency department, results above.     1714 Recheck and update.    1756 I personally reviewed the imaging and laboratory results with the patient and answered all related questions prior to discharge.    Impression & Plan      Medical Decision Making:  Humberto Jacinto is a 27 year old male who presents for evaluation of flu-like symptoms.  History, physical exam and imaging studies are consistent with pneumonia.  Patient was given aspirin, albuterol, tylenol, and IV fluids. There are no signs of complications of pneumonia at this point such as septic shock, bacteremia, empyema, hypoxia, respiratory failure or compromise.  His Curb-65 score is 0 points and he is low risk group with 0.6% 30-day mortality. Supportive  outpatient management is therefore indicated.  I will not therefore hospitalize for further cares or IV antibiotics.  This seems to be community acquired pneumonia and there are risk factors at this point for coverage of antibiotic-resistant strains so he will be stared on augmentin and azithromycin.  He did confirm associated chest pain, likely due to cough, and with negative troponin and normal EKG after greater than 24 hours of pain ACS is unlikely.  He is PERC negative and I doubt PE, dissection or other worrisome etiology for patients symptoms. Patient will follow-up with primary care physician regardless of disease course within 2 days maximum.  Signs for return visit to ED were discussed with patient and pneumonia precautions given for home.      Diagnosis:    ICD-10-CM    1. Pneumonia of left lower lobe due to infectious organism (H) J18.1     small left basilar infiltrate     Disposition:   The patient is discharged to home.    Discharge Medications:  New Prescriptions    ALBUTEROL (PROAIR HFA) 108 (90 BASE) MCG/ACT INHALER    Inhale 2 puffs into the lungs every 4 hours as needed for shortness of breath / dyspnea    AMOXICILLIN-CLAVULANATE (AUGMENTIN) 875-125 MG PER TABLET    Take 1 tablet by mouth 2 times daily for 10 days    AZITHROMYCIN (ZITHROMAX Z-JIM) 250 MG TABLET    Two tablets on the first day, then one tablet daily for the next 4 days     Scribe Disclosure:  I, Lars Garcia, am serving as a scribe at 4:14 PM on 8/21/2018 to document services personally performed by Iqra Gupta CNP based on my observations and the provider's statements to me.     EMERGENCY DEPARTMENT       Iqra Gupta CNP  08/21/18 2019

## 2018-08-21 NOTE — ED AVS SNAPSHOT
Emergency Department    [address] [facility] [address]    Phone:  [phone]    Fax:  [phone]                                       Humberto Jacinto   MRN: [id number]    Department:   Emergency Department   Date of Visit:  8/21/2018           Patient Information     Date Of Birth          1990        Your diagnoses for this visit were:     Pneumonia of left lower lobe due to infectious organism (H) small left basilar infiltrate       You were seen by Iqra Gupta, CNP.      Follow-up Information     Follow up with Vik Muir MD In 2 days.    Specialty:  Family Practice    Why:  for recheck or sooner with worsening or new symptoms    Contact information:    [facility]  [address]  [facility] [address] [phone]          Follow up with  Emergency Department.    Specialty:  EMERGENCY MEDICINE    Why:  As needed, If symptoms worsen    Contact information:    [address] [facility] [address] [phone]        Discharge Instructions       Discharge Instructions  Bronchitis, Pneumonia, Bronchospasm    You were seen today for a chest infection or inflammation. If your provider decided this was due to a bacterial infection, you may need an antibiotic. Sometimes these are caused by a virus, and then an antibiotic will not help.     Generally, every Emergency Department visit should have a follow-up clinic visit with either a primary or a specialty clinic/provider. Please follow-up as instructed by your emergency provider today.    Return to the Emergency Department if:    Your breathing gets much worse.    You are very weak, or feel much more ill.    You develop new symptoms, such as chest pain.    You cough up blood.    You are vomiting (throwing up) enough that you cannot keep fluids or your medicine down.    What can I do to help myself?    Fill any prescriptions the provider gave you and take them right away--especially antibiotics. Be sure to  finish the whole antibiotic prescription.    You may be given a prescription for an inhaler, which can help loosen tight air passages.  Use this as needed, but not more often than directed. Inhalers work much better when used with a spacer.     You may be given a prescription for a steroid to reduce inflammation. Used long-term, these can have side effects, but for short-term use they are safe. You may notice restlessness or increased appetite.        You may use non-prescription cough or cold medicines. Cough medicines may help, but don t make the cough go away completely.     Avoid smoke, because this can make your symptoms worse. If you smoke, this may be a good time to quit! Consider using nicotine lozenges, gum, or patches to reduce cravings.     If you have a fever, Tylenol  (acetaminophen), Motrin  (ibuprofen), or Advil  (ibuprofen) may help bring fever down and may help you feel more comfortable. Be sure to read and follow the package directions, and ask your provider if you have questions.    Be sure to get your flu shot each year.  For certain ages, the pneumonia shot can help prevent pneumonia.  If you were given a prescription for medicine here today, be sure to read all of the information (including the package insert) that comes with your prescription.  This will include important information about the medicine, its side effects, and any warnings that you need to know about.  The pharmacist who fills the prescription can provide more information and answer questions you may have about the medicine.  If you have questions or concerns that the pharmacist cannot address, please call or return to the Emergency Department.     Remember that you can always come back to the Emergency Department if you are not able to see your regular provider in the amount of time listed above, if you get any new symptoms, or if there is anything that worries you.    24 Hour Appointment Hotline       To make an appointment at  any [facility] clinic, call [phone] ([phone]). If you don't have a family doctor or clinic, we will help you find one. [facility] are conveniently located to serve the needs of you and your family.             Review of your medicines      START taking        Dose / Directions Last dose taken    albuterol 108 (90 Base) MCG/ACT inhaler   Commonly known as:  PROAIR HFA   Dose:  2 puff   Quantity:  1 Inhaler        Inhale 2 puffs into the lungs every 4 hours as needed for shortness of breath / dyspnea   Refills:  0        amoxicillin-clavulanate 875-125 MG per tablet   Commonly known as:  AUGMENTIN   Dose:  1 tablet   Quantity:  20 tablet        Take 1 tablet by mouth 2 times daily for 10 days   Refills:  0        azithromycin 250 MG tablet   Commonly known as:  ZITHROMAX Z-JIM   Quantity:  6 tablet        Two tablets on the first day, then one tablet daily for the next 4 days   Refills:  0          Our records show that you are taking the medicines listed below. If these are incorrect, please call your family doctor or clinic.        Dose / Directions Last dose taken    COUGH & COLD PO        Refills:  0                Prescriptions were sent or printed at these locations (3 Prescriptions)                   Other Prescriptions                Printed at Department/Unit printer (3 of 3)         albuterol (PROAIR HFA) 108 (90 Base) MCG/ACT inhaler               amoxicillin-clavulanate (AUGMENTIN) 875-125 MG per tablet               azithromycin (ZITHROMAX Z-JIM) 250 MG tablet                Procedures and tests performed during your visit     Basic metabolic panel    CBC with platelets differential    Cardiac Continuous Monitoring    EKG 12-lead, tracing only    Peripheral IV: Standard    Pulse oximetry nursing    Review medications with patient    Troponin I    XR Chest 2 Views      Orders Needing Specimen Collection     None      Pending Results     Date and Time Order Name Status Description     8/21/2018 1627 XR Chest 2 Views Preliminary             Pending Culture Results     No orders found from 8/19/2018 to 8/22/2018.            Pending Results Instructions     If you had any lab results that were not finalized at the time of your Discharge, you can call the ED Lab Result RN at [phone]. You will be contacted by this team for any positive Lab results or changes in treatment. The nurses are available 7 days a week from 10A to 6:30P.  You can leave a message 24 hours per day and they will return your call.        Test Results From Your Hospital Stay        8/21/2018  4:41 PM      Component Results     Component Value Ref Range & Units Status    WBC 16.4 (H) 4.0 - 11.0 10e9/L Final    RBC Count 4.08 (L) 4.4 - 5.9 10e12/L Final    Hemoglobin 12.6 (L) 13.3 - 17.7 g/dL Final    Hematocrit 36.8 (L) 40.0 - 53.0 % Final    MCV 90 78 - 100 fl Final    MCH 30.9 26.5 - 33.0 pg Final    MCHC 34.2 31.5 - 36.5 g/dL Final    RDW 12.5 10.0 - 15.0 % Final    Platelet Count 263 150 - 450 10e9/L Final    Diff Method Automated Method  Final    % Neutrophils 78.8 % Final    % Lymphocytes 10.1 % Final    % Monocytes 9.9 % Final    % Eosinophils 0.7 % Final    % Basophils 0.1 % Final    % Immature Granulocytes 0.4 % Final    Nucleated RBCs 0 0 /100 Final    Absolute Neutrophil 12.9 (H) 1.6 - 8.3 10e9/L Final    Absolute Lymphocytes 1.7 0.8 - 5.3 10e9/L Final    Absolute Monocytes 1.6 (H) 0.0 - 1.3 10e9/L Final    Absolute Eosinophils 0.1 0.0 - 0.7 10e9/L Final    Absolute Basophils 0.0 0.0 - 0.2 10e9/L Final    Abs Immature Granulocytes 0.1 0 - 0.4 10e9/L Final    Absolute Nucleated RBC 0.0  Final         8/21/2018  4:55 PM      Component Results     Component Value Ref Range & Units Status    Sodium 140 133 - 144 mmol/L Final    Potassium 3.9 3.4 - 5.3 mmol/L Final    Chloride 108 94 - 109 mmol/L Final    Carbon Dioxide 26 20 - 32 mmol/L Final    Anion Gap 6 3 - 14 mmol/L Final    Glucose 117 (H) 70 - 99 mg/dL Final     Urea Nitrogen 13 7 - 30 mg/dL Final    Creatinine 0.92 0.66 - 1.25 mg/dL Final    GFR Estimate >90 >60 mL/min/1.7m2 Final    Non  GFR Calc    GFR Estimate If Black >90 >60 mL/min/1.7m2 Final    African American GFR Calc    Calcium 8.5 8.5 - 10.1 mg/dL Final         8/21/2018  4:59 PM      Component Results     Component Value Ref Range & Units Status    Troponin I ES <0.015 0.000 - 0.045 ug/L Final    The 99th percentile for upper reference range is 0.045 ug/L.  Troponin values   in the range of 0.045 - 0.120 ug/L may be associated with risks of adverse   clinical events.           8/21/2018  4:43 PM      Narrative     XR CHEST 2 VW   8/21/2018 4:41 PM     HISTORY: Chest pain;     COMPARISON: None.    FINDINGS: The heart is negative.  There is a questionable small area  of infiltrate at the left lung base. This could potentially be due to  the very small area of pneumonitis.. The pulmonary vasculature is  normal.  The bones and soft tissues are unremarkable.        Impression     IMPRESSION: Questionable small left basilar infiltrate. This could  potentially be due to a small area of pneumonia. Clinical correlation  suggested.                    Clinical Quality Measure: Blood Pressure Screening     Your blood pressure was checked while you were in the emergency department today. The last reading we obtained was  BP: 117/70 . Please read the guidelines below about what these numbers mean and what you should do about them.  If your systolic blood pressure (the top number) is less than 120 and your diastolic blood pressure (the bottom number) is less than 80, then your blood pressure is normal. There is nothing more that you need to do about it.  If your systolic blood pressure (the top number) is 120-139 or your diastolic blood pressure (the bottom number) is 80-89, your blood pressure may be higher than it should be. You should have your blood pressure rechecked within a year by a primary care  "provider.  If your systolic blood pressure (the top number) is 140 or greater or your diastolic blood pressure (the bottom number) is 90 or greater, you may have high blood pressure. High blood pressure is treatable, but if left untreated over time it can put you at risk for heart attack, stroke, or kidney failure. You should have your blood pressure rechecked by a primary care provider within the next 4 weeks.  If your provider in the emergency department today gave you specific instructions to follow-up with your doctor or provider even sooner than that, you should follow that instruction and not wait for up to 4 weeks for your follow-up visit.        Thank you for choosing [facility]       Thank you for choosing [facility] for your care. Our goal is always to provide you with excellent care. Hearing back from our patients is one way we can continue to improve our services. Please take a few minutes to complete the written survey that you may receive in the mail after you visit with us. Thank you!        [employer] [employer] Information     [employer] lets you send messages to your doctor, view your test results, renew your prescriptions, schedule appointments and more. To sign up, go to [email or web address] . Click on \"Log in\" on the left side of the screen, which will take you to the Welcome page. Then click on \"Sign up Now\" on the right side of the page.     You will be asked to enter the access code listed below, as well as some personal information. Please follow the directions to create your username and password.     Your access code is: [email or web address]  Expires: 10/15/2018 10:09 AM     Your access code will  in 90 days. If you need help or a new code, please call your [facility] clinic or [phone].        Care EveryWhere ID     This is your Care EveryWhere ID. This could be used by other organizations to access your [facility] medical records  [id number]        Equal Access to Services     FRAN SYKES: Nuzhat callaway" yuliana Wiseman, vicenta wong, caio campuzano, ashanti stallings. So [facility] [phone].    ATENCIÓN: Si habla español, tiene a hart disposición servicios gratuitos de asistencia lingüística. Llame al [phone].    We comply with applicable federal civil rights laws and [place] laws. We do not discriminate on the basis of race, color, national origin, age, disability, sex, sexual orientation, or gender identity.            After Visit Summary       This is your record. Keep this with you and show to your community pharmacist(s) and doctor(s) at your next visit.

## 2018-08-21 NOTE — DISCHARGE INSTRUCTIONS
Discharge Instructions  Bronchitis, Pneumonia, Bronchospasm    You were seen today for a chest infection or inflammation. If your provider decided this was due to a bacterial infection, you may need an antibiotic. Sometimes these are caused by a virus, and then an antibiotic will not help.     Generally, every Emergency Department visit should have a follow-up clinic visit with either a primary or a specialty clinic/provider. Please follow-up as instructed by your emergency provider today.    Return to the Emergency Department if:    Your breathing gets much worse.    You are very weak, or feel much more ill.    You develop new symptoms, such as chest pain.    You cough up blood.    You are vomiting (throwing up) enough that you cannot keep fluids or your medicine down.    What can I do to help myself?    Fill any prescriptions the provider gave you and take them right away--especially antibiotics. Be sure to finish the whole antibiotic prescription.    You may be given a prescription for an inhaler, which can help loosen tight air passages.  Use this as needed, but not more often than directed. Inhalers work much better when used with a spacer.     You may be given a prescription for a steroid to reduce inflammation. Used long-term, these can have side effects, but for short-term use they are safe. You may notice restlessness or increased appetite.        You may use non-prescription cough or cold medicines. Cough medicines may help, but don t make the cough go away completely.     Avoid smoke, because this can make your symptoms worse. If you smoke, this may be a good time to quit! Consider using nicotine lozenges, gum, or patches to reduce cravings.     If you have a fever, Tylenol  (acetaminophen), Motrin  (ibuprofen), or Advil  (ibuprofen) may help bring fever down and may help you feel more comfortable. Be sure to read and follow the package directions, and ask your provider if you have questions.    Be sure  to get your flu shot each year.  For certain ages, the pneumonia shot can help prevent pneumonia.  If you were given a prescription for medicine here today, be sure to read all of the information (including the package insert) that comes with your prescription.  This will include important information about the medicine, its side effects, and any warnings that you need to know about.  The pharmacist who fills the prescription can provide more information and answer questions you may have about the medicine.  If you have questions or concerns that the pharmacist cannot address, please call or return to the Emergency Department.     Remember that you can always come back to the Emergency Department if you are not able to see your regular provider in the amount of time listed above, if you get any new symptoms, or if there is anything that worries you.

## 2018-08-21 NOTE — ED AVS SNAPSHOT
Emergency Department    [address] [place] [address]    Phone:  [phone]    Fax:  [phone]                                       Humberto Jacinto   MRN: [id number]    Department:   Emergency Department   Date of Visit:  8/21/2018           After Visit Summary Signature Page     I have received my discharge instructions, and my questions have been answered. I have discussed any challenges I see with this plan with the nurse or doctor.    ..........................................................................................................................................  Patient/Patient Representative Signature      ..........................................................................................................................................  Patient Representative Print Name and Relationship to Patient    ..................................................               ................................................  Date                                            Time    ..........................................................................................................................................  Reviewed by Signature/Title    ...................................................              ..............................................  Date                                                            Time

## 2020-03-13 ENCOUNTER — APPOINTMENT (OUTPATIENT)
Dept: GENERAL RADIOLOGY | Facility: CLINIC | Age: 30
End: 2020-03-13
Attending: EMERGENCY MEDICINE
Payer: OTHER GOVERNMENT

## 2020-03-13 ENCOUNTER — HOSPITAL ENCOUNTER (EMERGENCY)
Facility: CLINIC | Age: 30
Discharge: HOME | End: 2020-03-13
Attending: EMERGENCY MEDICINE | Admitting: EMERGENCY MEDICINE
Payer: OTHER GOVERNMENT

## 2020-03-13 VITALS
TEMPERATURE: 98.2 F | RESPIRATION RATE: 18 BRPM | OXYGEN SATURATION: 100 % | HEART RATE: 82 BPM | SYSTOLIC BLOOD PRESSURE: 115 MMHG | DIASTOLIC BLOOD PRESSURE: 73 MMHG

## 2020-03-13 DIAGNOSIS — R07.81 PLEURITIC CHEST PAIN: ICD-10-CM

## 2020-03-13 DIAGNOSIS — R50.9 FEVER IN ADULT: ICD-10-CM

## 2020-03-13 DIAGNOSIS — J18.9 PNEUMONIA OF LEFT LOWER LOBE DUE TO INFECTIOUS ORGANISM: Primary | ICD-10-CM

## 2020-03-13 LAB
DEPRECATED S PYO AG THROAT QL EIA: NEGATIVE
FLUAV+FLUBV AG SPEC QL: NEGATIVE
FLUAV+FLUBV AG SPEC QL: NEGATIVE
SPECIMEN SOURCE: NORMAL
STREP GROUP A PCR: NOT DETECTED

## 2020-03-13 PROCEDURE — 87804 INFLUENZA ASSAY W/OPTIC: CPT | Performed by: EMERGENCY MEDICINE

## 2020-03-13 PROCEDURE — 40001204 ZZHCL STATISTIC STREP A RAPID: Performed by: EMERGENCY MEDICINE

## 2020-03-13 PROCEDURE — 99284 EMERGENCY DEPT VISIT MOD MDM: CPT | Mod: 25

## 2020-03-13 PROCEDURE — 87651 STREP A DNA AMP PROBE: CPT | Performed by: EMERGENCY MEDICINE

## 2020-03-13 PROCEDURE — 71045 X-RAY EXAM CHEST 1 VIEW: CPT

## 2020-03-13 RX ORDER — AZITHROMYCIN 250 MG/1
TABLET, FILM COATED ORAL
Qty: 6 TABLET | Refills: 0 | Status: SHIPPED | OUTPATIENT
Start: 2020-03-13 | End: 2020-03-18

## 2020-03-13 RX ORDER — AMOXICILLIN 500 MG/1
1000 CAPSULE ORAL 3 TIMES DAILY
Qty: 42 CAPSULE | Refills: 0 | Status: SHIPPED | OUTPATIENT
Start: 2020-03-13 | End: 2020-03-20

## 2020-03-13 ASSESSMENT — ENCOUNTER SYMPTOMS
FEVER: 1
CHEST TIGHTNESS: 1
COUGH: 1

## 2020-03-13 NOTE — ED AVS SNAPSHOT
Emergency Department  [address] [place] [address]  Phone:  [phone]  Fax:  [phone]                                    Humberto Jacinto   MRN: [id number]    Department:   Emergency Department   Date of Visit:  3/13/2020           After Visit Summary Signature Page    I have received my discharge instructions, and my questions have been answered. I have discussed any challenges I see with this plan with the nurse or doctor.    ..........................................................................................................................................  Patient/Patient Representative Signature      ..........................................................................................................................................  Patient Representative Print Name and Relationship to Patient    ..................................................               ................................................  Date                                   Time    ..........................................................................................................................................  Reviewed by Signature/Title    ...................................................              ..............................................  Date                                               Time          22EPIC Rev 08/18

## 2020-03-13 NOTE — ED PROVIDER NOTES
History     Chief Complaint:  Chest pain and fever    HPI   Humberto Jacinto is a 29 year old male who presents with chest pain and a fever.  He explains that for the past couple days he has had sharp chest pains with a fever and cough.  He further explains that the fever started Monday, and since then has been taking Dayquil and Nyquil for his symptoms.  He also adds that his chest pains since Monday has not gotten better.  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  The patient admits being up to date on immunizations.  The patient denies any recent travels.    Allergies:  No known drug allergies    Medications:    Amoxil  Zithromax  Proair    Past Medical History:    Bell's palsy    Past Surgical History:    History reviewed. No pertinent surgical history.    Family History:    History reviewed. No pertinent family history.     Social History:  Smoking status: Yes  Alcohol use: No  Drug use: No  Presents to the ED by himself  Marital Status:  Single [1]     Review of Systems   Constitutional: Positive for fever.   Respiratory: Positive for cough and chest tightness.    Cardiovascular: Positive for chest pain.   All other systems reviewed and are negative.    Physical Exam     Patient Vitals for the past 24 hrs:   BP Temp Pulse Heart Rate Resp SpO2   03/13/20 0634 115/73 -- 82 -- -- 100 %   03/13/20 0500 121/50 98.2  F (36.8  C) -- 95 18 100 %       Physical Exam  General: Alert, appears well-developed and well-nourished. Cooperative.     In mild distress  HEENT:  Head:  Atraumatic  Ears:  External ears are normal  Mouth/Throat:  Oropharynx is without erythema or exudate and mucous membranes are moist.   Eyes:   Conjunctivae normal and EOM are normal. No scleral icterus.  CV:  Normal rate, regular rhythm, normal heart sounds and radial pulses are 2+ and symmetric.  No murmur.  Resp:  Breath sounds are clear bilaterally.   Dry cough. No wheezing.    Non-labored, no retractions or accessory muscle use  GI:  Abdomen is soft, no distension, no tenderness. No rebound or guarding.  No CVA tenderness bilaterally  MS:  Normal range of motion. No edema.    Normal strength in all 4 extremities.     Back atraumatic.    No midline cervical, thoracic, or lumbar tenderness  Skin:  Warm and dry.  No rash or lesions noted.  Neuro: Alert. Normal strength.  GCS: 15  Psych:  Normal mood and affect.  Lymph: No anterior or posterior cervical lymphadenopathy noted.    Emergency Department Course     Imaging:  Radiology findings were communicated with the patient who voiced understanding of the findings.    XR Chest port, 1 view:  Area of increased opacity in the medial aspect the left lung base retrocardiac region suspicious for infiltrate related to left lower lobe pneumonia. Remainder of the left lung clear. Right lung clear. Normal heart size. Normal pulmonary   vascularity. Osseous structures unremarkable.     Imaging independently reviewed and agree with radiologist interpretation.      Laboratory:  Laboratory findings were communicated with the patient who voiced understanding of the findings.    Influenza A/B Antigen: negative and negative    Streptococcus A Rapid Scr Reflx to PCR: negative    Emergency Department Course:  Past medical records, nursing notes, and vitals reviewed.    0521 I performed an exam of the patient as documented above.     Patient had throat swab  The patient was sent for a XR while in the emergency department, results above.     0625 I rechecked the patient and discussed the results of his workup thus far.     Findings and plan explained to the Patient. Patient discharged home with instructions regarding supportive care, medications, and reasons to return. The importance of close follow-up was reviewed. The patient was prescribed Amoxil and Zithromax.    I personally reviewed the laboratory and imaging results with the  Patient and answered all related questions prior to discharge.     Impression & Plan   Medical Decision Making:  Humberto Jacinto is a 29 year old male who presents for evaluation of fever, cough and flu like symptoms.  History, physical exam and imaging studies are consistent with pneumonia.  There are no signs of complications of pneumonia at this point such as septic shock, bacteremia, empyema, hypoxia, respiratory failure or compromise.  Supportive outpatient management is therefore indicated.  I will not therefore hospitalize for further cares or IV antibiotics.  This seems to be community acquired pneumonia and there are no risk factors at this point for coverage of antibiotic-resistant strains.  Due to current community spread of the novel coronavirus, patient was swabbed for this and understands that he needs to remain on quarantine for the next 72 hours until results return from this.  Certainly this would be a rare case of having a dual pneumonia in the setting of positive coronavirus, but patient understands the importance of remaining quarantined from a public health perspective to remain quarantined until contacted with results of swabs.  I doubt PE, dissection, acute coronary syndrome, or other worrisome etiology for patients symptoms. Patient will follow-up with primary care physician regardless of disease course within 5-7 days.  Signs for return visit to ED were discussed with patient and pneumonia precautions given for home.      Diagnosis:    ICD-10-CM    1. Pneumonia of left lower lobe due to infectious organism (H)  J18.1 Influenza A & B Antigen     Streptococcus A Rapid Scr w Reflx to PCR     Group A Streptococcus PCR Throat Swab     Group A Streptococcus PCR Throat Swab   2. Pleuritic chest pain  R07.81    3. Fever in adult  R50.9        Disposition:  Discharged to home.    Discharge Medications:  Discharge Medication List as of 3/13/2020  6:30 AM      START taking these medications    Details    amoxicillin (AMOXIL) 500 MG capsule Take 2 capsules (1,000 mg) by mouth 3 times daily for 7 days, Disp-42 capsule,R-0, Local Print      azithromycin (ZITHROMAX Z-JIM) 250 MG tablet Two tablets on the first day, then one tablet daily for the next 4 days, Disp-6 tablet,R-0, Local Print             Scribe Disclosure:  I, Wally Katz, am serving as a scribe at 5:21 AM on 3/13/2020 to document services personally performed by Mor Vaughn MD based on my observations and the provider's statements to me.        Mor Vaughn MD  03/13/20 8316

## 2020-03-13 NOTE — ED TRIAGE NOTES
Patient come in with flu like symptoms, complains of cough and shortness of breath, daughter has been sick at home.    Patient states that he is SOB with chest tightness.

## 2020-03-13 NOTE — DISCHARGE INSTRUCTIONS
Discharge Instructions  Bronchitis, Pneumonia, Bronchospasm    You were seen today for a chest infection or inflammation. If your provider decided this was due to a bacterial infection, you may need an antibiotic. Sometimes these are caused by a virus, and then an antibiotic will not help.     Generally, every Emergency Department visit should have a follow-up clinic visit with either a primary or a specialty clinic/provider. Please follow-up as instructed by your emergency provider today.    Return to the Emergency Department if:  Your breathing gets much worse.  You are very weak, or feel much more ill.  You develop new symptoms, such as chest pain.  You cough up blood.  You are vomiting (throwing up) enough that you cannot keep fluids or your medicine down.    What can I do to help myself?  Fill any prescriptions the provider gave you and take them right away--especially antibiotics. Be sure to finish the whole antibiotic prescription.  You may be given a prescription for an inhaler, which can help loosen tight air passages.  Use this as needed, but not more often than directed. Inhalers work much better when used with a spacer.   You may be given a prescription for a steroid to reduce inflammation. Used long-term, these can have side effects, but for short-term use they are safe. You may notice restlessness or increased appetite.      You may use non-prescription cough or cold medicines. Cough medicines may help, but don t make the cough go away completely.   Avoid smoke, because this can make your symptoms worse. If you smoke, this may be a good time to quit! Consider using nicotine lozenges, gum, or patches to reduce cravings.   If you have a fever, Tylenol  (acetaminophen), Motrin  (ibuprofen), or Advil  (ibuprofen) may help bring fever down and may help you feel more comfortable. Be sure to read and follow the package directions, and ask your provider if you have questions.  Be sure to get your flu shot each  year.  For certain ages, the pneumonia shot can help prevent pneumonia.  If you were given a prescription for medicine here today, be sure to read all of the information (including the package insert) that comes with your prescription.  This will include important information about the medicine, its side effects, and any warnings that you need to know about.  The pharmacist who fills the prescription can provide more information and answer questions you may have about the medicine.  If you have questions or concerns that the pharmacist cannot address, please call or return to the Emergency Department.     Remember that you can always come back to the Emergency Department if you are not able to see your regular provider in the amount of time listed above, if you get any new symptoms, or if there is anything that worries you.    You are being tested for Corona Virus 19, Influenza and possibly RSV.    Please use the information at the end of this document to sign up for [facility] [employer] where you can get your results and a message about those results sent to you through the [employer] application. If you do not have [employer] we will call you with your results but it may take longer.    Isolate Yourself:  Isolate yourself while traveling.  Do Not allow any visitors within 6 feet.  Do Not go to work or school.  Do Not go to [employer],  centers, shopping, or other public places.  Do Not shake hands.  Avoid close contact with others (hugging, kissing).    Protect Others:  Cover Your Mouth and Nose with a mask, disposable tissue or wash cloth to avoid spreading germs to others.  Wash your hands and face frequently with soap and water    Call Back If: Breathing difficulty develops or you become worse.    For more information about COVID19 and options for caring for yourself at home, please visit the CDC website at https://www.cdc.gov/coronavirus/2019-ncov/about/steps-when-sick.html  For more options for care at   [facility], please visit our website at [email or web address]

## 2020-03-15 ENCOUNTER — TELEPHONE (OUTPATIENT)
Dept: EMERGENCY MEDICINE | Facility: CLINIC | Age: 30
End: 2020-03-15

## 2020-03-15 NOTE — TELEPHONE ENCOUNTER
"Coronavirus (COVID-19) Notification    Reason for call  Notify of Negative COVID-19 lab result, assess symptoms,  review [facility] recommendations    Lab Result    Lab test 2019-nCoV rRt-PCR  Oropharyngeal AND/OR nasopharyngeal swabs were NEGATIVE for 2019-nCoV RNA    RN Assessment (Patient s current Symptoms), include time called.  [Insert Left message here if message left]  Humberto \"up and down\", continues to take abx as prescribed.      RN Recommendations/Instructions per [facility]  Patient notified of Negative COVID-19.    Patient can discontinue Quarantee and is free to resume normal activites.  If Patient has questions that you are not able to answer they can contact PCP or MD hotline ([phone])    Please Contact your PCP clinic or return to the Emergency department if your:    Symptoms worsen or other concerning symptom's.      Sandra Lewis RN    [employer]   Lung Nodule and ED Lab Results F/U RN  Epic pool (ED late result f/u RN) : [id number]   # [phone]    "

## 2020-03-21 LAB
COVID-19 VIRUS PCR RESULT FROM MDH: NEGATIVE
LAB SCANNED RESULT: NORMAL

## 2021-05-18 ENCOUNTER — HOSPITAL ENCOUNTER (EMERGENCY)
Facility: CLINIC | Age: 31
Discharge: HOME | End: 2021-05-18
Attending: NURSE PRACTITIONER | Admitting: NURSE PRACTITIONER

## 2021-05-18 VITALS
DIASTOLIC BLOOD PRESSURE: 58 MMHG | TEMPERATURE: 98.8 F | SYSTOLIC BLOOD PRESSURE: 121 MMHG | RESPIRATION RATE: 16 BRPM | OXYGEN SATURATION: 100 % | HEIGHT: 75 IN | WEIGHT: 170 LBS | BODY MASS INDEX: 21.14 KG/M2 | HEART RATE: 72 BPM

## 2021-05-18 DIAGNOSIS — K08.89 PAIN, DENTAL: ICD-10-CM

## 2021-05-18 PROCEDURE — 99283 EMERGENCY DEPT VISIT LOW MDM: CPT | Mod: 25

## 2021-05-18 PROCEDURE — 64400 NJX AA&/STRD TRIGEMINAL NRV: CPT

## 2021-05-18 RX ORDER — CLINDAMYCIN HCL 300 MG
300 CAPSULE ORAL 3 TIMES DAILY
Qty: 21 CAPSULE | Refills: 0 | Status: SHIPPED | OUTPATIENT
Start: 2021-05-18 | End: 2021-05-25

## 2021-05-18 RX ORDER — AMOXICILLIN 500 MG/1
CAPSULE ORAL
COMMUNITY
Start: 2021-05-17

## 2021-05-18 ASSESSMENT — MIFFLIN-ST. JEOR: SCORE: 1811.74

## 2021-05-19 NOTE — ED TRIAGE NOTES
Pt c/o Dental pain that started a couple of days ago. Saw a dentist last week and was placed on a 7 day Amoxicillin course. Pt states that the pain is getting worse.

## 2021-05-19 NOTE — ED PROVIDER NOTES
"  History   Chief Complaint:  Dental Pain     HPI   Humberto Jacinto is a 30 year old male with history of Bell's palsy who presents with dental pain. Patient was seen by his dentist seen days ago for dental pain and was placed on a seven day course of prophylactic amoxicillin. He has used three doses of his medication. He is supposed to see an oral surgeon for removal of his tooth. The swelling increased a day and a half ago and he continues to have dental pain.  He has noted improvement in swelling since starting the antibiotics.    Review of Systems   HENT: Positive for dental problem.    All other systems reviewed and are negative.    Allergies:  No known drug allergies    Medications:  Albuterol   Amoxicillin     Past Medical History:    Bell's Palsy     Family History:    Father - Substance Abuse     Social History:  Arrives with [facility].     Physical Exam     Patient Vitals for the past 24 hrs:   BP Temp Temp src Pulse Resp SpO2 Height Weight   05/18/21 2014 121/58 98.8  F (37.1  C) Oral 72 16 100 % 1.905 m (6' 3\") 77.1 kg (170 lb)       Physical Exam  Nursing notes reviewed. Vitals reviewed.  General: Alert. Mild discomfort . Well kept.  Eyes: PERRL, conjunctivae pink no scleral icterus or conjunctival injection  ENT:  Moist mucus membranes.  Left lower second bicuspid with near complete erosion and tenderness along the buccal mucosa gumline with no abscess.  No drainage. No cheek or submandibular edema.  No trismus.  Normal voice.  Respiratory:  Normal respiratory effort. No cough  CV: Normal rate   Musculoskeletal: No peripheral edema or calf tenderness  Neuro: Alert and oriented to person/place/time  Skin: Warm and dry. Normal appearance of visualized exposed skin  Psychiatric: Affect normal. Normal personal interaction. Good eye contact    Emergency Department Course     Procedures      Dental Block     INDICATIONS:  Dental Pain   LOCATION:  Left periapical   ANESTHESIA: Regional block using 0.5% Bupivacaine " total of 0.5 mLs   PROCEDURE NOTE: The patient tolerated the procedure well with good relief of discomfort and there were no complications.    Emergency Department Course:    Reviewed:  I reviewed nursing notes, vitals, past medical history and care everywhere    Assessments:  2042 I obtained history and examined the patient as noted above.     Disposition:  The patient was discharged to home.     Impression & Plan     Medical Decision Making:  Humberto Jacinto is a 30 year old male who presents for evaluation of dental pain as detailed above. Evaluation today showed poor dentition throughout.  There is no evidence of surrounding abscess. There is no evidence of deep space infection of the neck or any sepsis-like syndrome. The patient was offered a dental block for pain control. Given the concern for apical abscess and dental infection, the patient was given a prescription for clindamycin which he will begin if he does not have improvement using the amoxicillin in the next 24-48 hours and he will stop the amoxicillin at that time.  We discussed the risks associated with clindamycin including C. difficile diarrhea and he understands to stop the medication return to the ED he would develop any of the symptoms.  No indication for admission or IV antibiotics. The patient was strongly advised to seek dental care as soon as possible. I discussed with the patient that these medications did not represent definitive care for the tooth infection and definitive care by a dentist is needed. The patient is aware that I am not a dentist. Patient was also given a dental resource sheet. The patient will return to the emergency department for fever, uncontrolled pain, inability to tolerate PO, or onset of facial swelling. The patient expressed understanding.    Diagnosis:    ICD-10-CM    1. Pain, dental  K08.89        Discharge Medications:  Discharge Medication List as of 5/18/2021  8:59 PM      START taking these medications     Details   clindamycin (CLEOCIN) 300 MG capsule Take 1 capsule (300 mg) by mouth 3 times daily for 7 days, Disp-21 capsule, R-0, Local Print             Scribe Disclosure:  I, Paulo Wetzel, am serving as a scribe at 8:37 PM on 5/18/2021 to document services personally performed by Iqra Gupta, ASHLEY based on my observations and the provider's statements to me.            Iqra Gupta, CNP  05/18/21 3519